# Patient Record
Sex: FEMALE
[De-identification: names, ages, dates, MRNs, and addresses within clinical notes are randomized per-mention and may not be internally consistent; named-entity substitution may affect disease eponyms.]

---

## 2018-09-26 ENCOUNTER — HOSPITAL ENCOUNTER (INPATIENT)
Dept: HOSPITAL 97 - 2ND-WC | Age: 23
LOS: 1 days | Discharge: HOME | End: 2018-09-27
Attending: SPECIALIST | Admitting: SPECIALIST
Payer: SELF-PAY

## 2018-09-26 VITALS — BODY MASS INDEX: 26.2 KG/M2

## 2018-09-26 DIAGNOSIS — Z3A.39: ICD-10-CM

## 2018-09-26 LAB
HCT VFR BLD CALC: 39 % (ref 36–45)
LYMPHOCYTES # SPEC AUTO: 1.7 K/UL (ref 0.7–4.9)
MCH RBC QN AUTO: 35.5 PG (ref 27–35)
MCV RBC: 100.5 FL (ref 80–100)
PMV BLD: 11 FL (ref 7.6–11.3)
RBC # BLD: 3.88 M/UL (ref 3.86–4.86)
RPR SER QL: (no result)
UA DIPSTICK W REFLEX MICRO PNL UR: (no result)

## 2018-09-26 PROCEDURE — 81003 URINALYSIS AUTO W/O SCOPE: CPT

## 2018-09-26 PROCEDURE — 85025 COMPLETE CBC W/AUTO DIFF WBC: CPT

## 2018-09-26 PROCEDURE — 86850 RBC ANTIBODY SCREEN: CPT

## 2018-09-26 PROCEDURE — 36415 COLL VENOUS BLD VENIPUNCTURE: CPT

## 2018-09-26 PROCEDURE — 86592 SYPHILIS TEST NON-TREP QUAL: CPT

## 2018-09-26 PROCEDURE — 86901 BLOOD TYPING SEROLOGIC RH(D): CPT

## 2018-09-26 PROCEDURE — 10907ZC DRAINAGE OF AMNIOTIC FLUID, THERAPEUTIC FROM PRODUCTS OF CONCEPTION, VIA NATURAL OR ARTIFICIAL OPENING: ICD-10-PCS

## 2018-09-26 PROCEDURE — 86900 BLOOD TYPING SEROLOGIC ABO: CPT

## 2018-09-26 PROCEDURE — 87340 HEPATITIS B SURFACE AG IA: CPT

## 2018-09-26 NOTE — PREOPHP
Date of Admission:  2018



A 23-year-old  2, para 0, at 39 weeks and 2 days for induction; 1.5 to 2 cm, 50% to 60% efface
d, vertex, -1 station.  FHTs normal, reactive.  Rupture of membranes, clear fluid.  Rh positive, immu
ne to Rubella.  Negative beta strep screen.  Labor talk given.  Anticipate delivery sometime later to
day.





ASHLEY/KEI

DD:  2018 07:09:27Voice ID:  [id number]

## 2018-09-26 NOTE — PN
The patient is now completely dilated, +1 station.  She is very numb.  She is only on 8 mL an hour of
 maintenance dose, but we may have to turn it down a little bit but first we will see if she can push
 effectively.  Baby looks good.  If she is a good pusher, it should not take too long, but we will se
e how the epidural is affected her ability to push.





ASHLEY/KEI

DD:  09/26/2018 16:30:22Voice ID:  [id number]

DT:  09/26/2018 19:43:26Report ID:  [id number]

## 2018-09-26 NOTE — PN
A 23-year-old female, now 6.5 cm, 80% effaced, vertex, still -1 to almost 0 station.  Rene reg
ularly.  Baby looks good.





ASHLEY/KEI

DD:  09/26/2018 14:33:09Voice ID:  [id number]

DT:  09/26/2018 16:12:12Report ID:  [id number]

## 2018-09-26 NOTE — OP
Surgeon:  Alfredo Woo MD



A 23-year-old female,  2, para 0, AB 1, 39 weeks and 2-3 days, came at 1-1/2 to 2 cm, rupture 
of membranes, went into an active labor pattern.  At approximately 5-6 received epidural anesthesia, 
which gave good effect during remainder of labor and delivery.  In fact, epidural had to be cut back 
because the patient was so numb.  Second stage of approximately 1 hour because of the basically ineff
ective pushing.  Finally, the patient felt the urge to push and delivered spontaneously of an estimat
ed 8 pound male infant, Apgars 9 and 9.  Midline second degree episiotomy, repaired with 2-0 chromic.
  Schultze delivery of the placenta was inspected and noted to be intact and normal.  Mild uterine hy
potonus, 0.2 mg of Methergine IM.  Estimated blood loss 400 cc.  Rh positive, immune to Rubella.  Neg
ative beta strep screen.  Tolerated all procedures well.



Final Diagnoses:  

1.Term intrauterine pregnancy, 39 weeks and 3 days.

2.Vaginal delivery.

3.Epidural anesthesia.

4.Mild uterine hypotonus.





ASHLEY/MODL

DD:  2018 18:19:27Voice ID:  [id number]

DT:  2018 19:32:12Report ID:  [id number]

## 2018-09-27 VITALS — TEMPERATURE: 97.6 F | DIASTOLIC BLOOD PRESSURE: 55 MMHG | SYSTOLIC BLOOD PRESSURE: 96 MMHG

## 2018-09-27 NOTE — PN
The patient is on 20 milliunits of Pitocin, haroon every 2 minutes.  Baby looks good.  She has h
ad 1 dose of Stadol.  She is now 3 cm, 60-70% effaced, still -1 station, clear fluid.  Anticipate mor
e rapid progress once she achieves 5 cm.





ASHLEY/KEI

DD:  09/26/2018 11:27:04Voice ID:  [id number]

DT:  09/26/2018 16:23:30Report ID:  [id number]

## 2018-09-28 NOTE — DS
Date of Discharge:  09/27/2018



Hospital Course:  Ashlee Sepulveda is a 23-year-old primigravida, 39 weeks 3 days.  Delivery of an est
imated 8-pound plus or minus male infant.  Apgars 9 and 9.  Epidural anesthesia.  Second-degree episi
otomy repaired with 2-0 chromic.  Perez delivery of the placenta, which was inspected and noted to 
be intact and normal.  Mild uterine hypotonus, 0.2 mg of Methergine IM as well as IV drip Pitocin and
 massage.  Estimated blood loss 400 cc.  Rh positive, immune to Rubella.  Negative beta strep screen.
  Postpartum afebrile, ambulating and voiding.  Lochia is normal.  No post-epidural problems.  Will b
e dismissed later this evening.  To report back to my office in 6 weeks for followup.  To report any 
temperature elevation of 100 degrees or greater, severe pain, heavy bleeding, or any other type of ab
normalities.  Dismissed with tramadol for analgesia.



Final Diagnoses:  Term intrauterine pregnancy at 39 weeks 3 days, vaginal delivery, epidural anesthes
ia, mild uterine hypotonus.





ASHLEY/KEI

DD:  09/27/2018 07:14:42Voice ID:  [id number]

DT:  09/28/2018 01:22:26Report ID:  [id number]